# Patient Record
Sex: FEMALE | Race: WHITE | Employment: UNEMPLOYED | URBAN - METROPOLITAN AREA
[De-identification: names, ages, dates, MRNs, and addresses within clinical notes are randomized per-mention and may not be internally consistent; named-entity substitution may affect disease eponyms.]

---

## 2022-09-23 LAB
ANTIBODY SCREEN, EXTERNAL: NEGATIVE
CHLAMYDIA, EXTERNAL: NEGATIVE
HBSAG, EXTERNAL: NEGATIVE
HBSAG, EXTERNAL: NEGATIVE
HBSAG, EXTERNAL: NON REACTIVE
HBSAG, EXTERNAL: NON REACTIVE
HEPATITIS C AB,   EXT: NORMAL
HIV, EXTERNAL: NORMAL
N. GONORRHEA, EXTERNAL: NEGATIVE
RUBELLA, EXTERNAL: NORMAL
T. PALLIDUM, EXTERNAL: NONREACTIVE
T. PALLIDUM, EXTERNAL: NORMAL
TYPE, ABO & RH, EXTERNAL: NORMAL

## 2022-11-17 ENCOUNTER — HOSPITAL ENCOUNTER (INPATIENT)
Age: 22
LOS: 6 days | Discharge: HOME | DRG: 560 | End: 2022-11-23
Attending: OBSTETRICS & GYNECOLOGY | Admitting: OBSTETRICS & GYNECOLOGY
Payer: COMMERCIAL

## 2022-11-17 PROBLEM — O42.90 PROM (PREMATURE RUPTURE OF MEMBRANES): Status: ACTIVE | Noted: 2022-11-17

## 2022-11-17 LAB
BASOPHILS # BLD: 0 K/UL (ref 0–0.1)
BASOPHILS NFR BLD: 0 % (ref 0–1)
DIFFERENTIAL METHOD BLD: ABNORMAL
EOSINOPHIL # BLD: 0.2 K/UL (ref 0–0.4)
EOSINOPHIL NFR BLD: 1 % (ref 0–7)
ERYTHROCYTE [DISTWIDTH] IN BLOOD BY AUTOMATED COUNT: 13 % (ref 11.5–14.5)
HCT VFR BLD AUTO: 37.5 % (ref 35–47)
HGB BLD-MCNC: 12.7 G/DL (ref 11.5–16)
IMM GRANULOCYTES # BLD AUTO: 0.1 K/UL (ref 0–0.04)
IMM GRANULOCYTES NFR BLD AUTO: 1 % (ref 0–0.5)
LYMPHOCYTES # BLD: 2.3 K/UL (ref 0.8–3.5)
LYMPHOCYTES NFR BLD: 17 % (ref 12–49)
MCH RBC QN AUTO: 29.7 PG (ref 26–34)
MCHC RBC AUTO-ENTMCNC: 33.9 G/DL (ref 30–36.5)
MCV RBC AUTO: 87.6 FL (ref 80–99)
MONOCYTES # BLD: 1.1 K/UL (ref 0–1)
MONOCYTES NFR BLD: 8 % (ref 5–13)
NEUTS SEG # BLD: 9.6 K/UL (ref 1.8–8)
NEUTS SEG NFR BLD: 73 % (ref 32–75)
NRBC # BLD: 0 K/UL (ref 0–0.01)
NRBC BLD-RTO: 0 PER 100 WBC
PLATELET # BLD AUTO: 308 K/UL (ref 150–400)
PMV BLD AUTO: 11.2 FL (ref 8.9–12.9)
RBC # BLD AUTO: 4.28 M/UL (ref 3.8–5.2)
WBC # BLD AUTO: 13.2 K/UL (ref 3.6–11)

## 2022-11-17 PROCEDURE — 74011000258 HC RX REV CODE- 258: Performed by: OBSTETRICS & GYNECOLOGY

## 2022-11-17 PROCEDURE — 74011250637 HC RX REV CODE- 250/637: Performed by: OBSTETRICS & GYNECOLOGY

## 2022-11-17 PROCEDURE — 99285 EMERGENCY DEPT VISIT HI MDM: CPT

## 2022-11-17 PROCEDURE — 75410000002 HC LABOR FEE PER 1 HR

## 2022-11-17 PROCEDURE — 74011250636 HC RX REV CODE- 250/636: Performed by: OBSTETRICS & GYNECOLOGY

## 2022-11-17 PROCEDURE — 65270000029 HC RM PRIVATE

## 2022-11-17 PROCEDURE — 85025 COMPLETE CBC W/AUTO DIFF WBC: CPT

## 2022-11-17 PROCEDURE — 36415 COLL VENOUS BLD VENIPUNCTURE: CPT

## 2022-11-17 RX ORDER — SODIUM CHLORIDE 0.9 % (FLUSH) 0.9 %
5-40 SYRINGE (ML) INJECTION AS NEEDED
Status: DISCONTINUED | OUTPATIENT
Start: 2022-11-17 | End: 2022-11-21

## 2022-11-17 RX ORDER — SODIUM CHLORIDE, SODIUM LACTATE, POTASSIUM CHLORIDE, CALCIUM CHLORIDE 600; 310; 30; 20 MG/100ML; MG/100ML; MG/100ML; MG/100ML
125 INJECTION, SOLUTION INTRAVENOUS CONTINUOUS
Status: DISCONTINUED | OUTPATIENT
Start: 2022-11-17 | End: 2022-11-21

## 2022-11-17 RX ORDER — SODIUM CHLORIDE 0.9 % (FLUSH) 0.9 %
5-40 SYRINGE (ML) INJECTION EVERY 8 HOURS
Status: DISCONTINUED | OUTPATIENT
Start: 2022-11-17 | End: 2022-11-21

## 2022-11-17 RX ORDER — BETAMETHASONE SODIUM PHOSPHATE AND BETAMETHASONE ACETATE 3; 3 MG/ML; MG/ML
12 INJECTION, SUSPENSION INTRA-ARTICULAR; INTRALESIONAL; INTRAMUSCULAR; SOFT TISSUE ONCE
Status: COMPLETED | OUTPATIENT
Start: 2022-11-17 | End: 2022-11-17

## 2022-11-17 RX ORDER — AZITHROMYCIN 250 MG/1
250 TABLET, FILM COATED ORAL ONCE
Status: COMPLETED | OUTPATIENT
Start: 2022-11-17 | End: 2022-11-17

## 2022-11-17 RX ADMIN — AMPICILLIN SODIUM 2 G: 2 INJECTION, POWDER, FOR SOLUTION INTRAMUSCULAR; INTRAVENOUS at 20:45

## 2022-11-17 RX ADMIN — BETAMETHASONE SODIUM PHOSPHATE AND BETAMETHASONE ACETATE 12 MG: 3; 3 INJECTION, SUSPENSION INTRA-ARTICULAR; INTRALESIONAL; INTRAMUSCULAR; SOFT TISSUE at 20:48

## 2022-11-17 RX ADMIN — AZITHROMYCIN MONOHYDRATE 250 MG: 250 TABLET ORAL at 20:43

## 2022-11-17 RX ADMIN — SODIUM CHLORIDE, POTASSIUM CHLORIDE, SODIUM LACTATE AND CALCIUM CHLORIDE 125 ML/HR: 600; 310; 30; 20 INJECTION, SOLUTION INTRAVENOUS at 20:40

## 2022-11-18 LAB
A1 MICROGLOB PLACENTAL VAG QL: NEGATIVE
A1 MICROGLOB PLACENTAL VAG QL: POSITIVE
AMPHET UR QL SCN: NEGATIVE
BARBITURATES UR QL SCN: NEGATIVE
BENZODIAZ UR QL: NEGATIVE
CANNABINOIDS UR QL SCN: POSITIVE
COCAINE UR QL SCN: NEGATIVE
COMMENT, HOLDF: NORMAL
CONTROL LINE PRESENT?: NORMAL
CONTROL LINE PRESENT?: NORMAL
DRUG SCRN COMMENT,DRGCM: ABNORMAL
EXPIRATION DATE: NORMAL
EXPIRATION DATE: NORMAL
INTERNAL NEGATIVE CONTROL: NORMAL
INTERNAL NEGATIVE CONTROL: NORMAL
KIT LOT NO.: NORMAL
KIT LOT NO.: NORMAL
METHADONE UR QL: NEGATIVE
OPIATES UR QL: NEGATIVE
PCP UR QL: NEGATIVE
SAMPLES BEING HELD,HOLD: NORMAL

## 2022-11-18 PROCEDURE — 74011250637 HC RX REV CODE- 250/637: Performed by: OBSTETRICS & GYNECOLOGY

## 2022-11-18 PROCEDURE — 87491 CHLMYD TRACH DNA AMP PROBE: CPT

## 2022-11-18 PROCEDURE — 99204 OFFICE O/P NEW MOD 45 MIN: CPT | Performed by: OBSTETRICS & GYNECOLOGY

## 2022-11-18 PROCEDURE — 87081 CULTURE SCREEN ONLY: CPT

## 2022-11-18 PROCEDURE — 75410000002 HC LABOR FEE PER 1 HR

## 2022-11-18 PROCEDURE — 74011000258 HC RX REV CODE- 258: Performed by: OBSTETRICS & GYNECOLOGY

## 2022-11-18 PROCEDURE — 74011250636 HC RX REV CODE- 250/636: Performed by: OBSTETRICS & GYNECOLOGY

## 2022-11-18 PROCEDURE — 80307 DRUG TEST PRSMV CHEM ANLYZR: CPT

## 2022-11-18 PROCEDURE — 84112 EVAL AMNIOTIC FLUID PROTEIN: CPT | Performed by: OBSTETRICS & GYNECOLOGY

## 2022-11-18 PROCEDURE — 65270000029 HC RM PRIVATE

## 2022-11-18 RX ORDER — ACETAMINOPHEN 325 MG/1
650 TABLET ORAL
Status: DISCONTINUED | OUTPATIENT
Start: 2022-11-18 | End: 2022-11-21

## 2022-11-18 RX ORDER — FENTANYL CITRATE 50 UG/ML
50 INJECTION, SOLUTION INTRAMUSCULAR; INTRAVENOUS
Status: DISCONTINUED | OUTPATIENT
Start: 2022-11-18 | End: 2022-11-21

## 2022-11-18 RX ORDER — NALBUPHINE HYDROCHLORIDE 20 MG/ML
10 INJECTION, SOLUTION INTRAMUSCULAR; INTRAVENOUS; SUBCUTANEOUS
Status: DISCONTINUED | OUTPATIENT
Start: 2022-11-18 | End: 2022-11-21

## 2022-11-18 RX ORDER — TERBUTALINE SULFATE 1 MG/ML
0.25 INJECTION SUBCUTANEOUS AS NEEDED
Status: DISCONTINUED | OUTPATIENT
Start: 2022-11-18 | End: 2022-11-21

## 2022-11-18 RX ORDER — SODIUM CHLORIDE 0.9 % (FLUSH) 0.9 %
5-40 SYRINGE (ML) INJECTION AS NEEDED
Status: DISCONTINUED | OUTPATIENT
Start: 2022-11-18 | End: 2022-11-21

## 2022-11-18 RX ORDER — SODIUM CHLORIDE 0.9 % (FLUSH) 0.9 %
5-40 SYRINGE (ML) INJECTION EVERY 8 HOURS
Status: DISCONTINUED | OUTPATIENT
Start: 2022-11-18 | End: 2022-11-21

## 2022-11-18 RX ORDER — OXYTOCIN/RINGER'S LACTATE 30/500 ML
87.3 PLASTIC BAG, INJECTION (ML) INTRAVENOUS AS NEEDED
Status: DISCONTINUED | OUTPATIENT
Start: 2022-11-18 | End: 2022-11-21

## 2022-11-18 RX ORDER — BETAMETHASONE SODIUM PHOSPHATE AND BETAMETHASONE ACETATE 3; 3 MG/ML; MG/ML
12 INJECTION, SUSPENSION INTRA-ARTICULAR; INTRALESIONAL; INTRAMUSCULAR; SOFT TISSUE ONCE
Status: COMPLETED | OUTPATIENT
Start: 2022-11-18 | End: 2022-11-18

## 2022-11-18 RX ORDER — IBUPROFEN 200 MG
1 TABLET ORAL DAILY
Status: DISCONTINUED | OUTPATIENT
Start: 2022-11-18 | End: 2022-11-23 | Stop reason: HOSPADM

## 2022-11-18 RX ORDER — OXYTOCIN/RINGER'S LACTATE 30/500 ML
0-20 PLASTIC BAG, INJECTION (ML) INTRAVENOUS
Status: DISCONTINUED | OUTPATIENT
Start: 2022-11-19 | End: 2022-11-21

## 2022-11-18 RX ORDER — SODIUM CHLORIDE, SODIUM LACTATE, POTASSIUM CHLORIDE, CALCIUM CHLORIDE 600; 310; 30; 20 MG/100ML; MG/100ML; MG/100ML; MG/100ML
125 INJECTION, SOLUTION INTRAVENOUS CONTINUOUS
Status: DISCONTINUED | OUTPATIENT
Start: 2022-11-18 | End: 2022-11-21

## 2022-11-18 RX ORDER — OXYTOCIN/RINGER'S LACTATE 30/500 ML
10 PLASTIC BAG, INJECTION (ML) INTRAVENOUS AS NEEDED
Status: COMPLETED | OUTPATIENT
Start: 2022-11-18 | End: 2022-11-21

## 2022-11-18 RX ORDER — IBUPROFEN 200 MG
1 TABLET ORAL DAILY
Status: DISCONTINUED | OUTPATIENT
Start: 2022-11-19 | End: 2022-11-18

## 2022-11-18 RX ADMIN — BETAMETHASONE SODIUM PHOSPHATE AND BETAMETHASONE ACETATE 12 MG: 3; 3 INJECTION, SUSPENSION INTRA-ARTICULAR; INTRALESIONAL; INTRAMUSCULAR; SOFT TISSUE at 20:51

## 2022-11-18 RX ADMIN — AMPICILLIN SODIUM 2 G: 2 INJECTION, POWDER, FOR SOLUTION INTRAMUSCULAR; INTRAVENOUS at 03:33

## 2022-11-18 RX ADMIN — ACETAMINOPHEN 650 MG: 325 TABLET ORAL at 19:44

## 2022-11-18 RX ADMIN — AMPICILLIN SODIUM 2 G: 2 INJECTION, POWDER, FOR SOLUTION INTRAMUSCULAR; INTRAVENOUS at 23:08

## 2022-11-18 RX ADMIN — DINOPROSTONE 10 MG: 10 INSERT VAGINAL at 13:49

## 2022-11-18 RX ADMIN — SODIUM CHLORIDE, POTASSIUM CHLORIDE, SODIUM LACTATE AND CALCIUM CHLORIDE 125 ML/HR: 600; 310; 30; 20 INJECTION, SOLUTION INTRAVENOUS at 21:30

## 2022-11-18 RX ADMIN — AMPICILLIN SODIUM 2 G: 2 INJECTION, POWDER, FOR SOLUTION INTRAMUSCULAR; INTRAVENOUS at 11:02

## 2022-11-18 RX ADMIN — AMPICILLIN SODIUM 2 G: 2 INJECTION, POWDER, FOR SOLUTION INTRAMUSCULAR; INTRAVENOUS at 17:01

## 2022-11-18 NOTE — ED PROVIDER NOTES
HPI   C/o leaking fluid x 2 weeks  Denies fever, chills,   Pos Fetal movements  No contractions  No vaginal Bleeding  No chief complaint on file. No past medical history on file. No past surgical history on file. No family history on file. Social History     Socioeconomic History    Marital status: Not on file     Spouse name: Not on file    Number of children: Not on file    Years of education: Not on file    Highest education level: Not on file   Occupational History    Not on file   Tobacco Use    Smoking status: Not on file    Smokeless tobacco: Not on file   Substance and Sexual Activity    Alcohol use: Not on file    Drug use: Not on file    Sexual activity: Not on file   Other Topics Concern    Not on file   Social History Narrative    Not on file     Social Determinants of Health     Financial Resource Strain: Not on file   Food Insecurity: Not on file   Transportation Needs: Not on file   Physical Activity: Not on file   Stress: Not on file   Social Connections: Not on file   Intimate Partner Violence: Not on file   Housing Stability: Not on file         ALLERGIES: Patient has no allergy information on record. Review of Systems   Constitutional: Negative. HENT: Negative. Eyes: Negative. Respiratory: Negative. Cardiovascular: Negative. Gastrointestinal: Negative. Endocrine: Negative. Genitourinary: Negative. Musculoskeletal: Negative. Skin: Negative. Allergic/Immunologic: Negative. Neurological: Negative. Hematological: Negative. Psychiatric/Behavioral: Negative. Vitals:    11/17/22 1822   BP: (!) 102/59   Pulse: 95   Resp: 18   Temp: 98.2 °F (36.8 °C)            Physical Exam  Vitals and nursing note reviewed. Exam conducted with a chaperone present. Constitutional:       Appearance: Normal appearance. HENT:      Head: Normocephalic and atraumatic. Nose: Nose normal.      Mouth/Throat:      Mouth: Mucous membranes are dry.    Eyes: Extraocular Movements: Extraocular movements intact. Pupils: Pupils are equal, round, and reactive to light. Cardiovascular:      Rate and Rhythm: Normal rate and regular rhythm. Pulmonary:      Effort: Pulmonary effort is normal.   Abdominal:      General: Abdomen is flat. Genitourinary:     General: Normal vulva. Rectum: Normal.   Musculoskeletal:         General: Normal range of motion. Cervical back: Normal range of motion. Skin:     General: Skin is warm. Capillary Refill: Capillary refill takes less than 2 seconds. Neurological:      General: No focal deficit present. Mental Status: She is alert. Psychiatric:         Behavior: Behavior normal.      SVE:  V/V/- NEFG w/o clinicle evidence of PPROM and w/o any amniotic fluid  Cervix- Closed  Ut S=D  Adnexa- nonpalpable    MDM  Risk of Complications, Morbidity, and/or Mortality  Presenting problems: moderate  Diagnostic procedures: moderate  Management options: moderate                 Procedures  NST  FHR= 130  Variability- moderate  Decelerations- no  Accelerations- yes  Variability- moderate    [unfilled]   @ 35w5d w/o clinical evidence of PPROM on exam , however AMY is low and pad was saturated which is c/w likely ROM.   Will admit for expectant management

## 2022-11-18 NOTE — PROGRESS NOTES
1700. Bedside and Verbal shift change report given to alistair Dyer rn (oncoming nurse) by Brunilda Payan rn (offgoing nurse). Report included the following information SBAR, Intake/Output, MAR, and Recent Results.      2000. Bedside and Verbal shift change report given to leroy Ulloa rn (oncoming nurse) by alistair Dyer rn (offgoing nurse). Report included the following information SBAR, Intake/Output, MAR, and Recent Results.

## 2022-11-18 NOTE — PROGRESS NOTES
1940 Bedside and Verbal shift change report given to DOUGLAS Greene (oncoming nurse) by IFEANYI Hernandez (offgoing nurse). Report included the following information SBAR, Intake/Output, MAR, and Recent Results. 3010 Bedside and Verbal shift change report given to ROXANA Bowen (oncoming nurse) by Citlali López (offgoing nurse). Report included the following information SBAR, Intake/Output, MAR, and Recent Results.

## 2022-11-18 NOTE — PROGRESS NOTES
3868 Bedside and Verbal shift change report given to Diallo Gonzalez RN (oncoming nurse) by Roro Loya RN (offgoing nurse). Report included the following information SBAR, Procedure Summary, Accordion, and Recent Results. 0830 Viktorzer on call for [phone] Maile Mendez in to see pt. Amnisure test done, results came back negative. M called per MD order for AF check & growth scan. Awaiting appointment time. 8747 MFM called will see pt now. EFM. LR stopped. Pt up to BR. Denies leaking of fluid or pink discharge on dejan pad.    0930 Pt transferred via wheelchair to [facility]. 0 Pt returned via wheelchair from [facility]. [place] aware. POC to be discussed     1052 EFM resumed. 1102 IVF of LR resumed. Ampicillin 2 GM continues as GBS is unknown. 2305 [facility] on unit. MD reviewed [facility] report. Requested that supplies be gathered for ferning test. Pt flat in bed for pooling of any fluid to occur. 1135 Bedside and Verbal shift change report given to Marbella Swanson RN (oncoming nurse) by Diallo Gonzalez RN (offgoing nurse). Report included the following information SBAR, Procedure Summary, MAR, Accordion, and Recent Results.

## 2022-11-18 NOTE — PROGRESS NOTES
1135 Bedside report received from Nubia Gregg RN. Pt laying supine in anticipation of pooling test.     1235 Dr Christopher Watson at bedside, discussing plan of care with pt. Pt sleepy, continues to fall asleep while talking. Sterile speculum exam performed, pooling noted, amnisure positive, nitrazine negative. Plan for induction using Cervidil due to oligo. Mami 73 Dr Christopher Watson at bedside, Cervidil placed. 1640 Verbal report given to Gloria Delacruz RN. Pt resting, denies complaints.

## 2022-11-18 NOTE — PROGRESS NOTES
Ante Partum Progress Note    Julian Flores  37T6M    Assessment: 35w6d admitted with complaint of rupture of membranes. PPROM: Per MFM, oligohydramnios on ultrasound today. Exam was repeated - positive pooling, amnisure, and fluid leaking from cervix after patient asked to cough. Exam consistent with PPROM. Recommend IOL. Will start cervical ripening with cervidil. Plan pitocin in AM.      IUP: s/p one dose BMZ, second dose due at 2100 tonight. EFW 2378g (19%, AC 10%) today. Scant prenatal care: patient was incarcerated early in the pregnancy, then reports she didn't have a ride. Vape and marijuana use: patient has been advised to quit    Plan:  Cervidil tonight  Plan pitocin in AM  Amp for GBS unknown  Send gc/ch cultures    Orders/Charges: High and Non Stress Test    Subjective:  Patient has no complaints. She states she's been leaking for the last week. Her underwear constantly get wet and she has to change them. She had a gush of fluid yesterday when she was already on her way to the hospital.      Vitals:  Visit Vitals  /63 (BP 1 Location: Left upper arm, BP Patient Position: At rest)   Pulse 74   Temp 98.6 °F (37 °C)   Resp 16     Temp (24hrs), Av.3 °F (36.8 °C), Min:98.1 °F (36.7 °C), Max:98.6 °F (37 °C)      Last 24hr Input/Output:    Intake/Output Summary (Last 24 hours) at 2022 1259  Last data filed at 2022 1102  Gross per 24 hour   Intake 225 ml   Output --   Net 225 ml        Non stress test:  125, moderate variability, positive accelerations, no decelerations  Tocometry: no contractions    Patient Vitals for the past 4 hrs: Mode Fetal Heart Rate Fetal Activity Variability Decelerations Accelerations RN Reviewed Strip?   22 1129 External 125 Present 6-25 BPM Variable Yes Yes   22 1100 External 125 Present -- Variable Yes Yes   22 1052 US Readjusted; External 130 Present -- -- -- Yes   22 0915 External 135 -- 6-25 BPM -- Yes Yes 11/18/22 0904 US Readjusted; External 120 Present -- -- -- Yes    Patient Vitals for the past 4 hrs: Mode Fetal Heart Rate Fetal Activity Variability Decelerations Accelerations RN Reviewed Strip?   11/18/22 1129 External 125 Present 6-25 BPM Variable Yes Yes   11/18/22 1100 External 125 Present -- Variable Yes Yes   11/18/22 1052 US Readjusted; External 130 Present -- -- -- Yes   11/18/22 0915 External 135 -- 6-25 BPM -- Yes Yes   11/18/22 0904 US Readjusted; External 120 Present -- -- -- Yes            Exam:    GEN: NAD, sleeping, arousable to voice  Pulm: no resp distress  Abd: soft, mild diffuse tenderness but no specific tenderness (patient attributes this to tenderness after her ultrasound)  : no lesions, cervix ftp, 30%, high, vtx palpable  Speculum: pooling of cloudy fluid, +fluid leaking from cervical os when patient asked to cough, negative nitrazine, amnisure positive (no microscope available for ferning slide)      Labs:     Lab Results   Component Value Date/Time    WBC 13.2 (H) 11/17/2022 08:24 PM    HGB 12.7 11/17/2022 08:24 PM    HCT 37.5 11/17/2022 08:24 PM    PLATELET 975 48/30/2040 08:24 PM       Recent Results (from the past 24 hour(s))   CBC WITH AUTOMATED DIFF    Collection Time: 11/17/22  8:24 PM   Result Value Ref Range    WBC 13.2 (H) 3.6 - 11.0 K/uL    RBC 4.28 3.80 - 5.20 M/uL    HGB 12.7 11.5 - 16.0 g/dL    HCT 37.5 35.0 - 47.0 %    MCV 87.6 80.0 - 99.0 FL    MCH 29.7 26.0 - 34.0 PG    MCHC 33.9 30.0 - 36.5 g/dL    RDW 13.0 11.5 - 14.5 %    PLATELET 548 843 - 422 K/uL    MPV 11.2 8.9 - 12.9 FL    NRBC 0.0 0  WBC    ABSOLUTE NRBC 0.00 0.00 - 0.01 K/uL    NEUTROPHILS 73 32 - 75 %    LYMPHOCYTES 17 12 - 49 %    MONOCYTES 8 5 - 13 %    EOSINOPHILS 1 0 - 7 %    BASOPHILS 0 0 - 1 %    IMMATURE GRANULOCYTES 1 (H) 0.0 - 0.5 %    ABS. NEUTROPHILS 9.6 (H) 1.8 - 8.0 K/UL    ABS. LYMPHOCYTES 2.3 0.8 - 3.5 K/UL    ABS. MONOCYTES 1.1 (H) 0.0 - 1.0 K/UL    ABS.  EOSINOPHILS 0.2 0.0 - 0.4 K/UL ABS. BASOPHILS 0.0 0.0 - 0.1 K/UL    ABS. IMM. GRANS. 0.1 (H) 0.00 - 0.04 K/UL    DF AUTOMATED     RUPTURE OF FETAL MEMBRANES, POC    Collection Time: 11/18/22  8:12 AM   Result Value Ref Range    Rupture of fetal membrane Negative Negative    Control line present? Acceptable     Internal negative control Acceptable     Kit Lot No. [id number]     Expiration date 01/06/25    RUPTURE OF FETAL MEMBRANES, POC    Collection Time: 11/18/22 12:44 PM   Result Value Ref Range    Rupture of fetal membrane Positive Negative    Control line present?  Acceptable     Internal negative control Acceptable     Kit Lot No. [id number]     Expiration date 1/6/2025

## 2022-11-18 NOTE — PROGRESS NOTES
Cervidil placed in posterior fornix    Counseled about nicotine use, encouraged cessation. Offered a nicotine patch which patient accepted.

## 2022-11-18 NOTE — CONSULTS
MATERNAL FETAL MEDICINE  INPATIENT CONSULTATION    REQUESTED BY: Luiza Manley MD   DATE OF CONSULT: 22    REASON FOR CONSULTATION: oligohydramnios     MICHAEL Morrison is a 20 y.o.  at 35w6d admitted yesterday c/o a gush of fluid and some spotting. [place] reports 2 early sAbs in  & 2018 neither of which required a D&C; she denies other pregnancies. She reports regular menses prior to this pregnancy; she denies depression. Zoloft caused anaphylaxis. She does not report other significant history. She declined genetic screening. Patient Active Problem List   Diagnosis Code    PROM (premature rupture of membranes) O42.90       History reviewed. No pertinent past medical history. Past Surgical History:   Procedure Laterality Date    HX OTHER SURGICAL Left     Foot surgery       Allergies   Allergen Reactions    Zoloft [Sertraline] Hives and Swelling         Current Facility-Administered Medications:     sodium chloride (NS) flush 5-40 mL, 5-40 mL, IntraVENous, Q8H, Valerio Nichols MD    sodium chloride (NS) flush 5-40 mL, 5-40 mL, IntraVENous, PRN, Valerio Nichols MD    ampicillin (OMNIPEN) 2 g in 0.9% sodium chloride (MBP/ADV) 100 mL MBP, 2 g, IntraVENous, Q6H, Valerio Nichols MD, Last Rate: 200 mL/hr at 22 0333, 2 g at 22 0333    lactated Ringers infusion, 125 mL/hr, IntraVENous, CONTINUOUS, Valerio Nichols MD, Last Rate: 125 mL/hr at 22, 125 mL/hr at 22    Social History     Tobacco Use    Smoking status: Every Day    Smokeless tobacco: Current   Vaping Use    Vaping Use: Every day    Substances: Nicotine, Flavoring    Devices: Pre-filled or refillable cartridge   Substance Use Topics    Alcohol use: Not Currently    Drug use: Yes     Types: Marijuana       History reviewed. No pertinent family history.     Visit Vitals  BP (!) 120/56   Pulse 70   Temp 98.1 °F (36.7 °C)   Resp 16       Gen: Comfortable, NAD  Resp: Comfortable respirations  CV: Well perfused  Ext: Moving all extremities well  Neuro: Alert, interactive, appropriate    Recent Results (from the past 24 hour(s))   CBC WITH AUTOMATED DIFF    Collection Time: 11/17/22  8:24 PM   Result Value Ref Range    WBC 13.2 (H) 3.6 - 11.0 K/uL    RBC 4.28 3.80 - 5.20 M/uL    HGB 12.7 11.5 - 16.0 g/dL    HCT 37.5 35.0 - 47.0 %    MCV 87.6 80.0 - 99.0 FL    MCH 29.7 26.0 - 34.0 PG    MCHC 33.9 30.0 - 36.5 g/dL    RDW 13.0 11.5 - 14.5 %    PLATELET 657 433 - 124 K/uL    MPV 11.2 8.9 - 12.9 FL    NRBC 0.0 0  WBC    ABSOLUTE NRBC 0.00 0.00 - 0.01 K/uL    NEUTROPHILS 73 32 - 75 %    LYMPHOCYTES 17 12 - 49 %    MONOCYTES 8 5 - 13 %    EOSINOPHILS 1 0 - 7 %    BASOPHILS 0 0 - 1 %    IMMATURE GRANULOCYTES 1 (H) 0.0 - 0.5 %    ABS. NEUTROPHILS 9.6 (H) 1.8 - 8.0 K/UL    ABS. LYMPHOCYTES 2.3 0.8 - 3.5 K/UL    ABS. MONOCYTES 1.1 (H) 0.0 - 1.0 K/UL    ABS. EOSINOPHILS 0.2 0.0 - 0.4 K/UL    ABS. BASOPHILS 0.0 0.0 - 0.1 K/UL    ABS. IMM. GRANS. 0.1 (H) 0.00 - 0.04 K/UL    DF AUTOMATED     RUPTURE OF FETAL MEMBRANES, POC    Collection Time: 11/18/22  8:12 AM   Result Value Ref Range    Rupture of fetal membrane Negative Negative    Control line present? Acceptable     Internal negative control Acceptable     Kit Lot No. [id number]     Expiration date 01/06/25        ULTRASOUND:  11/18/22: Single viable IUP in vertex position with biometry consistent with her EDC; EFW = 2378g (19th%, AC = 10th%). Fetal anatomy seen appears WNL although the scan is globally limited due to late gestational age. There is oligohydramnios with MVP = 4.9cm. Posterior placenta appears WNL although views are limited by late gestational age. BPP 8/8    We discussed risks of vaping to the fetus including PPROM and if this happens early could cause permanent organ damage including brain damage or death. I recommended complete cessation before her next pregnancy.       We discussed risks of marijuana including predilection of THC for fetal neurons and unknown long term neurodevelopmental effects to the fetus. I recommended complete cessation for her next pregnancy. Patient was counseled on the findings. Questions and concerns were addressed. ASSESSMENT:    20 y.o.  at 35w6d with likley PPROM. PLAN:  Would pool patient for at least 30 min and then do pool/nitrazine/fern testing  Deliver if rules-in for PPROM; if rules-out would repeat BPP in 1 week  BMZ 22 dose #2 today  On latency abx  NICU consult   Reassuring fetal status    A total of 60 minutes was spent on this visit reviewing previous notes, counseling the patient and documenting the findings in the note.     Morgan Donald M.D.

## 2022-11-18 NOTE — PROGRESS NOTES
~0440: The patient arrived wheelchair from with reports of leaking of clear, pink tinged fluid for 2 weeks. The patient reports fetal movement, but not as much movement as usual.  The patient and her family member are escorted to [place] [id number].  ~182: Dr Randi Lieberman is informed of the patient's arrival.  He is assisting with a  and states that he will see the patient when he is finished. Dr Randi Lieberman states that it is okay to check the patient.  ~1830: Nitrazine is negative with SVE, but positive on fluid on sanitary pad.  ~185: Dr Randi Lieberman is at the bedside speaking with the patient. Speculum exam and SVE done. ~5311: Bedside ultrasound is done to view fluid level. Fetus is vertex. Dr Randi Lieberman is discussing the plan of care with the patient (admit as inpatient, antibiotics, ultrasound in the morning). ~3226: The patient is transferred ambulatory to [facility] accompanied by her sister. All belongings are collected before transfer. ~194: TRANSFER - OUT REPORT:    Verbal report given to S. Sharol Lombard, RN on [place]  being transferred to [facility] for routine progression of care       Report consisted of patients Situation, Background, Assessment and   Recommendations(SBAR). Information from the following report(s) SBAR was reviewed with the receiving nurse. Lines:       Opportunity for questions and clarification was provided.       Patient transported with:   Registered Nurse

## 2022-11-19 PROCEDURE — 74011250636 HC RX REV CODE- 250/636: Performed by: OBSTETRICS & GYNECOLOGY

## 2022-11-19 PROCEDURE — 74011000258 HC RX REV CODE- 258: Performed by: OBSTETRICS & GYNECOLOGY

## 2022-11-19 PROCEDURE — 65270000029 HC RM PRIVATE

## 2022-11-19 PROCEDURE — 75410000002 HC LABOR FEE PER 1 HR

## 2022-11-19 RX ORDER — CALCIUM CARBONATE 200(500)MG
1000 TABLET,CHEWABLE ORAL ONCE
Status: COMPLETED | OUTPATIENT
Start: 2022-11-20 | End: 2022-11-20

## 2022-11-19 RX ADMIN — AMPICILLIN SODIUM 2 G: 2 INJECTION, POWDER, FOR SOLUTION INTRAMUSCULAR; INTRAVENOUS at 10:56

## 2022-11-19 RX ADMIN — SODIUM CHLORIDE, POTASSIUM CHLORIDE, SODIUM LACTATE AND CALCIUM CHLORIDE 125 ML/HR: 600; 310; 30; 20 INJECTION, SOLUTION INTRAVENOUS at 22:43

## 2022-11-19 RX ADMIN — NALBUPHINE HYDROCHLORIDE 10 MG: 20 INJECTION, SOLUTION INTRAMUSCULAR; INTRAVENOUS; SUBCUTANEOUS at 11:51

## 2022-11-19 RX ADMIN — AMPICILLIN SODIUM 2 G: 2 INJECTION, POWDER, FOR SOLUTION INTRAMUSCULAR; INTRAVENOUS at 16:54

## 2022-11-19 RX ADMIN — OXYTOCIN 8 MILLI-UNITS/MIN: 10 INJECTION INTRAVENOUS at 11:50

## 2022-11-19 RX ADMIN — OXYTOCIN 2 MILLI-UNITS/MIN: 10 INJECTION INTRAVENOUS at 06:05

## 2022-11-19 RX ADMIN — PROMETHAZINE HYDROCHLORIDE 12.5 MG: 25 INJECTION INTRAMUSCULAR; INTRAVENOUS at 11:47

## 2022-11-19 RX ADMIN — AMPICILLIN SODIUM 2 G: 2 INJECTION, POWDER, FOR SOLUTION INTRAMUSCULAR; INTRAVENOUS at 05:08

## 2022-11-19 RX ADMIN — NALBUPHINE HYDROCHLORIDE 10 MG: 20 INJECTION, SOLUTION INTRAMUSCULAR; INTRAVENOUS; SUBCUTANEOUS at 19:47

## 2022-11-19 RX ADMIN — SODIUM CHLORIDE, POTASSIUM CHLORIDE, SODIUM LACTATE AND CALCIUM CHLORIDE 125 ML/HR: 600; 310; 30; 20 INJECTION, SOLUTION INTRAVENOUS at 15:04

## 2022-11-19 NOTE — PROGRESS NOTES
Labor Progress Note  Patient seen, fetal heart rate and contraction pattern evaluated, patient examined. She's starting to feel some crampy pain. No data found. Physical Exam:  Cervical Exam:  deferred  Membranes:   Premature Rupture of Membranes; Amniotic Fluid: clear fluid  Uterine Activity: ild, q3-5 min  Fetal Heart Rate: Baseline: 120 per minute  Variability: moderate  Accelerations: yes  Decelerations: none    Pit @ 16    Assessment/Plan:  21 yo  @ 36 wks. Induction for PPROM.  Continue pitocin

## 2022-11-19 NOTE — PROGRESS NOTES
1300 Bedside and Verbal shift change report given to IFEANYI Lucas RN (oncoming nurse) by IFEANYI Ho RN (offgoing nurse). Report included the following information SBAR, Intake/Output, MAR, and Recent Results. 1 Dr Macy Machado at bedside and reviewed fhr strip    1940 Bedside and Verbal shift change report given to IFEANYI Hernandez RN (oncoming nurse) by Jeff Monroe RN (offgoing nurse). Report included the following information SBAR, Intake/Output, MAR, and Recent Results.

## 2022-11-19 NOTE — PROGRESS NOTES
2000 Bedside and Verbal shift change report given to DOUGLAS Linares (oncoming nurse) by Jeovanny Zarco (offgoing nurse). Report included the following information SBAR, Intake/Output, MAR, and Recent Results. 0225 SVE 1-2. After attempt to remove cervidil, no cervidil noted. Cervidil discovered in bedside commode. Pt unaware of when this occurred. 0730 Bedside and Verbal shift change report given to IFEANYI Bustos (oncoming nurse) by Trinidad Dhillon (offgoing nurse). Report included the following information SBAR, Intake/Output, MAR, and Recent Results.

## 2022-11-19 NOTE — PROGRESS NOTES
@0526 Bedside shift change report given to IFEANYI Ho RN (oncoming nurse) by Bernadette Lenz RN (offgoing nurse). Report included the following information SBAR, Kardex, Intake/Output, and MAR.      @1100 Patient moving often in the bed due to discomfort. Frequent repositioning making it difficult to obtain continuous FHT. Explained that in order to give IV pain medication we must have a tracing of FHT, patient verbalized understanding. @6571 patient up to bathroom.

## 2022-11-20 LAB
ERYTHROCYTE [DISTWIDTH] IN BLOOD BY AUTOMATED COUNT: 12.8 % (ref 11.5–14.5)
HCT VFR BLD AUTO: 33.1 % (ref 35–47)
HGB BLD-MCNC: 11 G/DL (ref 11.5–16)
MCH RBC QN AUTO: 29.9 PG (ref 26–34)
MCHC RBC AUTO-ENTMCNC: 33.2 G/DL (ref 30–36.5)
MCV RBC AUTO: 89.9 FL (ref 80–99)
NRBC # BLD: 0 K/UL (ref 0–0.01)
NRBC BLD-RTO: 0 PER 100 WBC
PLATELET # BLD AUTO: 233 K/UL (ref 150–400)
PMV BLD AUTO: 11.7 FL (ref 8.9–12.9)
RBC # BLD AUTO: 3.68 M/UL (ref 3.8–5.2)
WBC # BLD AUTO: 15.5 K/UL (ref 3.6–11)

## 2022-11-20 PROCEDURE — 65270000029 HC RM PRIVATE

## 2022-11-20 PROCEDURE — 74011250637 HC RX REV CODE- 250/637: Performed by: OBSTETRICS & GYNECOLOGY

## 2022-11-20 PROCEDURE — 85027 COMPLETE CBC AUTOMATED: CPT

## 2022-11-20 PROCEDURE — 75410000002 HC LABOR FEE PER 1 HR

## 2022-11-20 PROCEDURE — 36415 COLL VENOUS BLD VENIPUNCTURE: CPT

## 2022-11-20 RX ORDER — ZOLPIDEM TARTRATE 5 MG/1
5 TABLET ORAL ONCE
Status: COMPLETED | OUTPATIENT
Start: 2022-11-20 | End: 2022-11-20

## 2022-11-20 RX ADMIN — Medication 25 MCG: at 21:05

## 2022-11-20 RX ADMIN — ZOLPIDEM TARTRATE 5 MG: 5 TABLET ORAL at 21:11

## 2022-11-20 RX ADMIN — CALCIUM CARBONATE (ANTACID) CHEW TAB 500 MG 1000 MG: 500 CHEW TAB at 00:18

## 2022-11-20 RX ADMIN — Medication 25 MCG: at 12:25

## 2022-11-20 RX ADMIN — Medication 25 MCG: at 17:02

## 2022-11-20 NOTE — PROGRESS NOTES
0000 Verbal shift change report given to DOUGLAS Talbot (oncoming nurse) by IFEANYI Hong (offgoing nurse). Report included the following information SBAR, Intake/Output, MAR, and Recent Results. 0019 Pt sitting upright in bed. 0024 Pt ambulating to bathroom. 0215 Pt turned on left side. 0223 Pt turned on right side. 0245 Pt placed in Trendelenburg position. 200mL fluid bolus administered. 0410 RNs at bedside. Pt repositioned on right side. Pitocin stopped. 100mL fluid bolus administered. 0740 Verbal shift change report given to IFEANYI Gardiner (oncoming nurse) by David Ortiz (offgoing nurse). Report included the following information SBAR, Intake/Output, MAR, and Recent Results.

## 2022-11-20 NOTE — PROGRESS NOTES
~1940: Bedside and Verbal shift change report given to IFEANYI Valle RN by Pamela Arboleda RN. Report included the following information SBAR and MAR.

## 2022-11-20 NOTE — PROGRESS NOTES
7258 Bedside and Verbal shift change report given to IFEANYI Avendano RN (oncoming nurse) by Adonis Ren RN (offgoing nurse). Report included the following information SBAR, Intake/Output, MAR, and Recent Results. 8197 Dr Niko Ng at bedside    1930 Bedside and Verbal shift change report given to IFEANYI Wells RN (oncoming nurse) by Navarro Lopes RN (offgoing nurse). Report included the following information SBAR, Intake/Output, MAR, and Recent Results.

## 2022-11-20 NOTE — PROGRESS NOTES
Labor Progress Note  Patient seen, fetal heart rate and contraction pattern evaluated, patient examined. She's starting to feel more contractions  No data found. Physical Exam:  Cervical Exam:  1.5/50/-2  Membranes:   Premature Rupture of Membranes; Amniotic Fluid: clear fluid  Uterine Activity: q2-5 min  Fetal Heart Rate: Baseline: 120s per minute  Variability: moderate  Accelerations: yes  Decelerations: none    Assessment/Plan:  19 yo  @ 36 wks. Induction for PPROM. Not yet in labor. Will do a 2 hour pitocin break a bit later. Consider trying cytotec if no progress with pitocin.

## 2022-11-20 NOTE — PROGRESS NOTES
Labor Progress Note  Assumed care of Ms Ben Gandara  Chart reviewed    On 11/18  35w6d admitted with complaint of rupture of membranes. PPROM: Per MFM, oligohydramnios on ultrasound today. Exam was repeated - positive pooling, amnisure, and fluid leaking from cervix after patient asked to cough. Exam consistent with PPROM. Recommend IOL. Will start cervical ripening with cervidil. Plan pitocin in AM.         EFW 2378g (19%, AC 10%) on 11/18      Scant prenatal care: patient was incarcerated early in the pregnancy, then reports she didn't have a ride. Vape and marijuana use: patient has been advised to quit       S/P Cervidil, Pt for 24 hrs yesterday  Patient seen, fetal heart rate and contraction pattern evaluated. Denies feeling any contractions    VSS AF  Physical Exam:  Cervical Exam: not examined, 1 cm last night  Membranes:  Intact  Uterine Activity: Frequency: Irregular  Fetal Heart Rate: Reactive  Accelerations: yes  Decelerations: none  Uterine contractions: irregular    Assessment/Plan:  Reassuring fetal status.    Will do cytotec 25 mcg every 4 hrs    Jelly Paez MD

## 2022-11-21 ENCOUNTER — ANESTHESIA (OUTPATIENT)
Dept: LABOR AND DELIVERY | Age: 22
DRG: 560 | End: 2022-11-21
Payer: COMMERCIAL

## 2022-11-21 ENCOUNTER — ANESTHESIA EVENT (OUTPATIENT)
Dept: LABOR AND DELIVERY | Age: 22
DRG: 560 | End: 2022-11-21
Payer: COMMERCIAL

## 2022-11-21 LAB
BACTERIA SPEC CULT: NORMAL
SERVICE CMNT-IMP: NORMAL

## 2022-11-21 PROCEDURE — 74011250636 HC RX REV CODE- 250/636: Performed by: OBSTETRICS & GYNECOLOGY

## 2022-11-21 PROCEDURE — 75410000003 HC RECOV DEL/VAG/CSECN EA 0.5 HR

## 2022-11-21 PROCEDURE — 65410000002 HC RM PRIVATE OB

## 2022-11-21 PROCEDURE — 75410000000 HC DELIVERY VAGINAL/SINGLE

## 2022-11-21 PROCEDURE — 74011000250 HC RX REV CODE- 250: Performed by: ANESTHESIOLOGY

## 2022-11-21 PROCEDURE — 75410000002 HC LABOR FEE PER 1 HR

## 2022-11-21 PROCEDURE — 0HQ9XZZ REPAIR PERINEUM SKIN, EXTERNAL APPROACH: ICD-10-PCS | Performed by: OBSTETRICS & GYNECOLOGY

## 2022-11-21 PROCEDURE — 74011250636 HC RX REV CODE- 250/636: Performed by: ANESTHESIOLOGY

## 2022-11-21 PROCEDURE — 74011250637 HC RX REV CODE- 250/637: Performed by: OBSTETRICS & GYNECOLOGY

## 2022-11-21 PROCEDURE — 00HU33Z INSERTION OF INFUSION DEVICE INTO SPINAL CANAL, PERCUTANEOUS APPROACH: ICD-10-PCS | Performed by: ANESTHESIOLOGY

## 2022-11-21 PROCEDURE — 76060000078 HC EPIDURAL ANESTHESIA

## 2022-11-21 PROCEDURE — 3E033VJ INTRODUCTION OF OTHER HORMONE INTO PERIPHERAL VEIN, PERCUTANEOUS APPROACH: ICD-10-PCS | Performed by: OBSTETRICS & GYNECOLOGY

## 2022-11-21 RX ORDER — LIDOCAINE HYDROCHLORIDE AND EPINEPHRINE 15; 5 MG/ML; UG/ML
INJECTION, SOLUTION EPIDURAL AS NEEDED
Status: DISCONTINUED | OUTPATIENT
Start: 2022-11-21 | End: 2022-11-21 | Stop reason: HOSPADM

## 2022-11-21 RX ORDER — OXYTOCIN/RINGER'S LACTATE 30/500 ML
87.3 PLASTIC BAG, INJECTION (ML) INTRAVENOUS AS NEEDED
Status: DISCONTINUED | OUTPATIENT
Start: 2022-11-21 | End: 2022-11-23 | Stop reason: HOSPADM

## 2022-11-21 RX ORDER — DOCUSATE SODIUM 100 MG/1
100 CAPSULE, LIQUID FILLED ORAL
Status: DISCONTINUED | OUTPATIENT
Start: 2022-11-21 | End: 2022-11-23 | Stop reason: HOSPADM

## 2022-11-21 RX ORDER — ZOLPIDEM TARTRATE 5 MG/1
5 TABLET ORAL
Status: DISCONTINUED | OUTPATIENT
Start: 2022-11-21 | End: 2022-11-23 | Stop reason: HOSPADM

## 2022-11-21 RX ORDER — FENTANYL CITRATE 50 UG/ML
100 INJECTION, SOLUTION INTRAMUSCULAR; INTRAVENOUS ONCE
Status: COMPLETED | OUTPATIENT
Start: 2022-11-21 | End: 2022-11-21

## 2022-11-21 RX ORDER — OXYCODONE AND ACETAMINOPHEN 5; 325 MG/1; MG/1
2 TABLET ORAL
Status: DISCONTINUED | OUTPATIENT
Start: 2022-11-21 | End: 2022-11-23 | Stop reason: HOSPADM

## 2022-11-21 RX ORDER — HYDROCORTISONE ACETATE PRAMOXINE HCL 2.5; 1 G/100G; G/100G
CREAM TOPICAL AS NEEDED
Status: DISCONTINUED | OUTPATIENT
Start: 2022-11-21 | End: 2022-11-23 | Stop reason: HOSPADM

## 2022-11-21 RX ORDER — NALBUPHINE HYDROCHLORIDE 20 MG/ML
5 INJECTION, SOLUTION INTRAMUSCULAR; INTRAVENOUS; SUBCUTANEOUS
Status: DISCONTINUED | OUTPATIENT
Start: 2022-11-21 | End: 2022-11-21

## 2022-11-21 RX ORDER — NALOXONE HYDROCHLORIDE 0.4 MG/ML
0.4 INJECTION, SOLUTION INTRAMUSCULAR; INTRAVENOUS; SUBCUTANEOUS AS NEEDED
Status: DISCONTINUED | OUTPATIENT
Start: 2022-11-21 | End: 2022-11-21

## 2022-11-21 RX ORDER — OXYCODONE AND ACETAMINOPHEN 5; 325 MG/1; MG/1
1 TABLET ORAL
Status: DISCONTINUED | OUTPATIENT
Start: 2022-11-21 | End: 2022-11-23 | Stop reason: HOSPADM

## 2022-11-21 RX ORDER — NORETHINDRONE AND ETHINYL ESTRADIOL 0.5-0.035
10 KIT ORAL ONCE
Status: DISCONTINUED | OUTPATIENT
Start: 2022-11-21 | End: 2022-11-21

## 2022-11-21 RX ORDER — FENTANYL CITRATE 50 UG/ML
INJECTION, SOLUTION INTRAMUSCULAR; INTRAVENOUS
Status: COMPLETED
Start: 2022-11-21 | End: 2022-11-21

## 2022-11-21 RX ORDER — ACETAMINOPHEN 325 MG/1
650 TABLET ORAL
Status: DISCONTINUED | OUTPATIENT
Start: 2022-11-21 | End: 2022-11-21 | Stop reason: SDUPTHER

## 2022-11-21 RX ORDER — SIMETHICONE 80 MG
80 TABLET,CHEWABLE ORAL
Status: DISCONTINUED | OUTPATIENT
Start: 2022-11-21 | End: 2022-11-23 | Stop reason: HOSPADM

## 2022-11-21 RX ORDER — DIPHENHYDRAMINE HCL 25 MG
25 CAPSULE ORAL
Status: DISCONTINUED | OUTPATIENT
Start: 2022-11-21 | End: 2022-11-23 | Stop reason: HOSPADM

## 2022-11-21 RX ORDER — IBUPROFEN 400 MG/1
800 TABLET ORAL EVERY 8 HOURS
Status: DISCONTINUED | OUTPATIENT
Start: 2022-11-21 | End: 2022-11-21 | Stop reason: SDUPTHER

## 2022-11-21 RX ORDER — SODIUM CHLORIDE 0.9 % (FLUSH) 0.9 %
5-40 SYRINGE (ML) INJECTION EVERY 8 HOURS
Status: DISCONTINUED | OUTPATIENT
Start: 2022-11-21 | End: 2022-11-23 | Stop reason: HOSPADM

## 2022-11-21 RX ORDER — FENTANYL/BUPIVACAINE/NS/PF 2-1250MCG
1-16 PREFILLED PUMP RESERVOIR EPIDURAL CONTINUOUS
Status: DISCONTINUED | OUTPATIENT
Start: 2022-11-21 | End: 2022-11-21

## 2022-11-21 RX ORDER — OXYTOCIN/RINGER'S LACTATE 30/500 ML
10 PLASTIC BAG, INJECTION (ML) INTRAVENOUS AS NEEDED
Status: DISCONTINUED | OUTPATIENT
Start: 2022-11-21 | End: 2022-11-23 | Stop reason: HOSPADM

## 2022-11-21 RX ORDER — IBUPROFEN 400 MG/1
800 TABLET ORAL EVERY 8 HOURS
Status: DISCONTINUED | OUTPATIENT
Start: 2022-11-21 | End: 2022-11-23 | Stop reason: HOSPADM

## 2022-11-21 RX ORDER — HYDROCORTISONE 1 %
CREAM (GRAM) TOPICAL AS NEEDED
Status: DISCONTINUED | OUTPATIENT
Start: 2022-11-21 | End: 2022-11-23 | Stop reason: HOSPADM

## 2022-11-21 RX ORDER — SODIUM CHLORIDE, SODIUM LACTATE, POTASSIUM CHLORIDE, CALCIUM CHLORIDE 600; 310; 30; 20 MG/100ML; MG/100ML; MG/100ML; MG/100ML
25 INJECTION, SOLUTION INTRAVENOUS CONTINUOUS
Status: DISCONTINUED | OUTPATIENT
Start: 2022-11-21 | End: 2022-11-21

## 2022-11-21 RX ORDER — DIPHENHYDRAMINE HCL 25 MG
25 CAPSULE ORAL
Status: DISCONTINUED | OUTPATIENT
Start: 2022-11-21 | End: 2022-11-21 | Stop reason: SDUPTHER

## 2022-11-21 RX ORDER — OXYCODONE AND ACETAMINOPHEN 5; 325 MG/1; MG/1
1 TABLET ORAL
Status: DISCONTINUED | OUTPATIENT
Start: 2022-11-21 | End: 2022-11-21 | Stop reason: SDUPTHER

## 2022-11-21 RX ORDER — AMMONIA 15 % (W/V)
1 AMPUL (EA) INHALATION AS NEEDED
Status: DISCONTINUED | OUTPATIENT
Start: 2022-11-21 | End: 2022-11-23 | Stop reason: HOSPADM

## 2022-11-21 RX ORDER — FOLIC ACID/MULTIVIT,IRON,MINER 0.4MG-18MG
1 TABLET ORAL DAILY
Status: DISCONTINUED | OUTPATIENT
Start: 2022-11-21 | End: 2022-11-23 | Stop reason: HOSPADM

## 2022-11-21 RX ORDER — LANOLIN ALCOHOL/MO/W.PET/CERES
3 CREAM (GRAM) TOPICAL
Status: DISCONTINUED | OUTPATIENT
Start: 2022-11-21 | End: 2022-11-23 | Stop reason: HOSPADM

## 2022-11-21 RX ORDER — ACETAMINOPHEN 325 MG/1
650 TABLET ORAL
Status: DISCONTINUED | OUTPATIENT
Start: 2022-11-21 | End: 2022-11-23 | Stop reason: HOSPADM

## 2022-11-21 RX ORDER — ONDANSETRON 4 MG/1
4 TABLET, ORALLY DISINTEGRATING ORAL
Status: ACTIVE | OUTPATIENT
Start: 2022-11-21 | End: 2022-11-22

## 2022-11-21 RX ORDER — BUPIVACAINE HYDROCHLORIDE 2.5 MG/ML
INJECTION, SOLUTION EPIDURAL; INFILTRATION; INTRACAUDAL AS NEEDED
Status: DISCONTINUED | OUTPATIENT
Start: 2022-11-21 | End: 2022-11-21 | Stop reason: HOSPADM

## 2022-11-21 RX ORDER — DOCUSATE SODIUM 100 MG/1
100 CAPSULE, LIQUID FILLED ORAL
Status: DISCONTINUED | OUTPATIENT
Start: 2022-11-21 | End: 2022-11-21 | Stop reason: SDUPTHER

## 2022-11-21 RX ORDER — ONDANSETRON 4 MG/1
4 TABLET, ORALLY DISINTEGRATING ORAL
Status: DISCONTINUED | OUTPATIENT
Start: 2022-11-21 | End: 2022-11-23 | Stop reason: HOSPADM

## 2022-11-21 RX ORDER — NALOXONE HYDROCHLORIDE 0.4 MG/ML
0.4 INJECTION, SOLUTION INTRAMUSCULAR; INTRAVENOUS; SUBCUTANEOUS AS NEEDED
Status: DISCONTINUED | OUTPATIENT
Start: 2022-11-21 | End: 2022-11-23 | Stop reason: HOSPADM

## 2022-11-21 RX ORDER — BUPIVACAINE HYDROCHLORIDE 2.5 MG/ML
INJECTION, SOLUTION EPIDURAL; INFILTRATION; INTRACAUDAL
Status: COMPLETED
Start: 2022-11-21 | End: 2022-11-21

## 2022-11-21 RX ORDER — SODIUM CHLORIDE 0.9 % (FLUSH) 0.9 %
5-40 SYRINGE (ML) INJECTION AS NEEDED
Status: DISCONTINUED | OUTPATIENT
Start: 2022-11-21 | End: 2022-11-23 | Stop reason: HOSPADM

## 2022-11-21 RX ADMIN — SODIUM CHLORIDE, POTASSIUM CHLORIDE, SODIUM LACTATE AND CALCIUM CHLORIDE 125 ML/HR: 600; 310; 30; 20 INJECTION, SOLUTION INTRAVENOUS at 02:42

## 2022-11-21 RX ADMIN — NALBUPHINE HYDROCHLORIDE 10 MG: 20 INJECTION, SOLUTION INTRAMUSCULAR; INTRAVENOUS; SUBCUTANEOUS at 02:41

## 2022-11-21 RX ADMIN — OXYTOCIN 2 MILLI-UNITS/MIN: 10 INJECTION INTRAVENOUS at 04:05

## 2022-11-21 RX ADMIN — IBUPROFEN 800 MG: 400 TABLET, FILM COATED ORAL at 10:08

## 2022-11-21 RX ADMIN — SODIUM CHLORIDE, POTASSIUM CHLORIDE, SODIUM LACTATE AND CALCIUM CHLORIDE 125 ML/HR: 600; 310; 30; 20 INJECTION, SOLUTION INTRAVENOUS at 06:45

## 2022-11-21 RX ADMIN — OXYTOCIN 87.3 MILLI-UNITS/MIN: 10 INJECTION INTRAVENOUS at 09:13

## 2022-11-21 RX ADMIN — LIDOCAINE HYDROCHLORIDE,EPINEPHRINE BITARTRATE 1 ML: 15; .005 INJECTION, SOLUTION EPIDURAL; INFILTRATION; INTRACAUDAL; PERINEURAL at 06:27

## 2022-11-21 RX ADMIN — BUPIVACAINE HYDROCHLORIDE 1 MG: 2.5 INJECTION, SOLUTION EPIDURAL; INFILTRATION; INTRACAUDAL; PERINEURAL at 06:27

## 2022-11-21 RX ADMIN — OXYCODONE AND ACETAMINOPHEN 1 TABLET: 5; 325 TABLET ORAL at 22:38

## 2022-11-21 RX ADMIN — IBUPROFEN 800 MG: 400 TABLET, FILM COATED ORAL at 18:33

## 2022-11-21 RX ADMIN — OXYTOCIN 10000 MILLI-UNITS: 10 INJECTION INTRAVENOUS at 08:58

## 2022-11-21 RX ADMIN — Medication 10 ML/HR: at 06:45

## 2022-11-21 RX ADMIN — FENTANYL CITRATE 100 MCG: 50 INJECTION, SOLUTION INTRAMUSCULAR; INTRAVENOUS at 06:27

## 2022-11-21 NOTE — L&D DELIVERY NOTE
Delivery Summary  Patient: Sage Baires             Circumcision:   desires  Additional Delivery Comments - Uncomplicated PTSVD     Information for the patient's : Geovanni Espino [[id number]]     Delivery Type: Vaginal, Spontaneous   Delivery Date: 2022   Delivery Time: 8:50 AM     Birth Weight:       Sex:  male  Delivery Clinician:  Muna Chatterjee   Gestational Age: 37w1d    Presentation: Vertex   Position:             Apgars were 9  and 9      Resuscitation Method: Tactile Stimulation;Suctioning-bulb     Meconium Stained:  Thin    Living Status: Living       Placenta Date/Time:     Placenta Removal:     Placenta Appearance:      Cord Information: 3 Vessels    Cord Events:         Disposition of Cord Blood: Discard    Blood Gases Sent?:  No     Cord pH:  none    Episiotomy:    Laceration(s):      Estimated Blood Loss (ml): No data found    Labor Events  Method: Oxytocin      Augmentation:    Cervical Ripening:     Cervidil;Misoprostol        Operative Vaginal Delivery - none

## 2022-11-21 NOTE — LACTATION NOTE
This note was copied from a baby's chart. Initial Lactation Consultation - Baby born vaginally this morning to a  mom at 39 2/7 weeks gestation. Mom noticed breast changes during her pregnancy and has bene able to express colostrum. Mom states baby latched and nursed a few minutes at delivery. I helped mom with a feeding this afternoon. We reviewed positioning the baby at the breast. I helped mom get baby latched deeply. Baby began sucking rhythmically with audible swallows. We were able to get baby latched on both breasts. Feeding Plan: Mother will keep baby skin to skin as often as possible, feed on demand, respond to feeding cues, obtain latch, listen for audible swallowing, be aware of signs of oxytocin release/ cramping, thirst and sleepiness while breastfeeding. Reviewed effects/risks of late  delivery on initiation of breast feeding including infant's sleepiness, ineffective or missed breast feedings, infant's decreased stamina to sustain prolonged latch and effective breast feeding, decreased energy reserves related to low birth weight and inability to stimulate milk supply. Recommended interventions include skin to skin, feeding on cues and pumping as needed to ensure adequate milk supply.

## 2022-11-21 NOTE — PROGRESS NOTES
Bedside shift change report given to ROXANA Schneider RN (oncoming nurse) by IFEANYI Chan RN (offgoing nurse). Report included the following information SBAR, Kardex, and MAR.     [id number] IV bolus started and pt placed in trendelenburg for recurrent variables. [id number] Dr Fabiola Byrd requested at bedside to evaluate pt for variables. Cervix is checked and pt is complete. We will let pt labor down for a short bit until physician available to be at bedside.

## 2022-11-21 NOTE — PROGRESS NOTES
Bedside and Verbal shift change report given to TIMUR Vera RN (oncoming nurse) by Flor Loo. Uzair Driver RN (offgoing nurse). Report included the following information SBAR, Kardex, Procedure Summary, and MAR.

## 2022-11-21 NOTE — ANESTHESIA PROCEDURE NOTES
CSE Block    Start time: 11/21/2022 6:17 AM  End time: 11/21/2022 6:31 AM  Performed by: Sebas Heath DO  Authorized by: Sebas Heath DO     Pre-Procedure  Indications: procedure for pain    preanesthetic checklist: patient identified, risks and benefits discussed, anesthesia consent, site marked, patient being monitored, timeout performed and fire risk safety assessment completed and verbalized    Timeout Time: 06:17 EST      Procedure:   Patient Position:  Seated  Prep Region:  Lumbar  Prep: chlorhexidine    Location:  L2-3    Epidural Needle:   Needle Type:  Tuohy  Needle Gauge:  17 G  Injection Technique:  Loss of resistance using air  Attempts:  1    Spinal Needle:   Needle Type:  Pencan  Needle Gauge:  25 G    Catheter:   Catheter Type:  Flex-tip  Catheter Size:  18 G  Catheter at Skin Depth (cm):  12  Depth in Epidural Space (cm):  5  Events: no blood with aspiration and CSF confirmed    Med Admin time: 11/21/2022 6:27 AM    Assessment:   Catheter Secured:  Tegaderm  Insertion:  Uncomplicated  Patient tolerance:  Patient tolerated the procedure well with no immediate complications  Meds:    2 ml of 1% Lidocaine plain for skin & subQ    - 1.0 ml of 0.255 marcaine isobaric for CSE dose  - 1.0 ml of 1.5% Lidocaine w/ epi 1:200,000 for test dose  - 100 mcg fentanyl in epidural

## 2022-11-21 NOTE — PROGRESS NOTES
Labor Progress Note  Patient seen, fetal heart rate and contraction pattern evaluated. Comfortable with Epidural    VSS AF    Physical Exam:  Cervical Exam:6-7 mildly swollen anteriorly/70/-2  Membranes:  PPROM  Uterine Activity: Frequency: Irregular  Fetal Heart Rate: Reactive  Accelerations: yes  Decelerations: variable  Uterine contractions: irregular    Assessment/Plan:  Reassuring fetal status.    Continue to titrate Marcelenlolly Smith MD

## 2022-11-21 NOTE — ANESTHESIA PREPROCEDURE EVALUATION
Anesthetic History   No history of anesthetic complications            Review of Systems / Medical History  Patient summary reviewed, nursing notes reviewed and pertinent labs reviewed    Pulmonary  Within defined limits                 Neuro/Psych         Psychiatric history     Cardiovascular                  Exercise tolerance: >4 METS     GI/Hepatic/Renal  Within defined limits              Endo/Other  Within defined limits           Other Findings              Physical Exam    Airway  Mallampati: II  TM Distance: 4 - 6 cm  Neck ROM: normal range of motion        Cardiovascular  Regular rate and rhythm,  S1 and S2 normal,  no murmur, click, rub, or gallop             Dental  No notable dental hx       Pulmonary                 Abdominal         Other Findings            Anesthetic Plan    ASA: 2  Anesthesia type: CSE            Anesthetic plan and risks discussed with: Patient

## 2022-11-21 NOTE — L&D DELIVERY NOTE
Delivery Summary  Patient: Salma Durán             Circumcision:   desirekaelyn Tucker)  Additional Delivery Comments - Called to bedside for delivery as Dr. Victoriano Mobley was scrubbed in the OR and the baby's head was visible at the introitus when the RN went to I&O her. Pt was comfortable with urge to push when she had a contraction. Due to very thick meconium present, NICU was called for delivery as well. Pt pushed with great effort through about 2 contractions and delivered a VMI Princess Tucker) in ISIDRO position. He had his left hand and cord in front of his neck/chest at delivery. No difficulty with delivery of shoulders/body. Baby was placed directly on mom's abdomen. NICU ok'd delayed cord clamping, approximately 1 minute. It was then doubly clamped by me and then cut by mom's support person Janae Wells; dad was not back in time for delivery). As the baby was doing well, NICU ok'd him to stay on mom's chest and they left. There was a 3VC attached to an intact meconium stained placenta that delivered within 5 minutes wo difficulty. A small, non hemostatic, 1st degree was repaired with a figure of 8 stitch of 2.0 monocryl. I&O cath was performed sterilely by me for 600 cc of urine. Mom and baby Danny Anaya were left in room doing skin to skin. QBL 88 mL  Weight 2270 g (5#0.1oz)  Apgars 9 & 9        Information for the patient's : Nelida Londono [facility] [[id number]]     Labor Events:    Labor: Yes    Steroids: Full Course   Cervical Ripening Date/Time:       Cervical Ripening Type: Cervidil;Misoprostol   Antibiotics During Labor: Yes   Rupture Identifier:      Rupture Date/Time:       Rupture Type: SROM   Amniotic Fluid Volume:      Amniotic Fluid Description:      Amniotic Fluid Odor:      Induction: Oxytocin       Induction Date/Time: 2022 4:05 AM    Indications for Induction: Prolonged ROM; Premature ROM    Augmentation:     Augmentation Date/Time:      Indications for Augmentation:     Labor complications: Additional complications:        Delivery Events:  Indications For Episiotomy:     Episiotomy: None   Perineal Laceration(s): 1st   Repaired:     Periurethral Laceration Location:      Repaired:     Labial Laceration Location:     Repaired:     Sulcal Laceration Location:     Repaired:     Vaginal Laceration Location:     Repaired:     Cervical Laceration Location:     Repaired:     Repair Suture: Monocryl 2-0   Number of Repair Packets: 1   Estimated Blood Loss (ml):  ml   Quantitative Blood Loss (ml)                Delivery Date: 2022    Delivery Time: 8:50 AM  Delivery Type: Vaginal, Spontaneous  Sex:  Male    Gestational Age: 37w1d   Delivery Clinician:  Marla Jose  Living Status: Living   Delivery Location: L&D 4          APGARS  One minute Five minutes Ten minutes   Skin color: 1   1        Heart rate: 2   2        Grimace: 2   2        Muscle tone: 2   2        Breathin   2        Totals: 9   9            Presentation: Vertex    Position:        Resuscitation Method:  Tactile Stimulation;Suctioning-bulb     Meconium Stained: Thin      Cord Information: 3 Vessels  Complications:    Cord around:    Delayed cord clamping?     Cord clamped date/time:2022  8:52 AM  Disposition of Cord Blood: Discard    Blood Gases Sent?: No    Placenta:  Date/Time: 2022  8:52 AM  Removal: Spontaneous      Appearance: Normal      Measurements:  Birth Weight: 2.27 kg      Birth Length: 47 cm      Head Circumference: 31.5 cm      Chest Circumference:       Abdominal Girth: 28.5 cm    Other Providers:   ;Kendra MONTEMAYOR;JADE LU;DERIK QUINTANILLA;ARAVIND TREVIZO;;;;;;ANNETTE BENSON, Obstetrician;Primary Nurse;Primary  Nurse;Nicu Nurse;Neonatologist;Anesthesiologist;Crna;Nurse Practitioner;Midwife;Nursery Nurse;Respiratory Therapist         Cord pH:  none    Episiotomy: None   Laceration(s): 1st     Estimated Blood Loss (ml): No data found    Labor Events  Method: Oxytocin      Augmentation:    Cervical Ripening:     Cervidil;Misoprostol        Hospital Problems  Never Reviewed            Codes Class Noted POA    PROM (premature rupture of membranes) ICD-10-CM: O42.90  ICD-9-CM: 658.10  2022 Unknown           Operative Vaginal Delivery - none    Group B Strep: No results found for: GRBSEXT, GRBSEXT  Information for the patient's :    Abraham Narayan [[id number]]   No results found for: ABORH, PCTABR, PCTDIG, BILI, ABORHEXT, ABORH   No results found for: APH, APCO2, APO2, AHCO3, ABEC, ABDC, O2ST, EPHV, PCO2V, PO2V, HCO3V, EBEV, EBDV, SITE, RSCOM

## 2022-11-21 NOTE — PROGRESS NOTES
~5721: Bedside and Verbal shift change report given to IFEANYI Hernandez RN by IFEANYI Irby RN. Report included the following information SBAR.   ~2019: External monitors are removed in preparation for transfer to &D 4.  ~2157: Dr Archie Grant is called and informed that the patient now has meconium stained fluid. Verbal order is received to start Pitocin at 0300 and do not give any more Cytotec.   ~0742: Bedside and Verbal shift change report given to L. Valentino Sings by IFEANYI Hernandez RN. Report included the following information SBAR, MAR, Accordion, and Recent Results.

## 2022-11-21 NOTE — ROUTINE PROCESS
1125- TRANSFER - IN REPORT:    Verbal report received from Alek Boston RN(name) on [facility]  being received from L&D(unit) for routine progression of care      Report consisted of patients Situation, Background, Assessment and   Recommendations(SBAR). Information from the following report(s) SBAR was reviewed with the receiving nurse. Opportunity for questions and clarification was provided. Assessment completed upon patients arrival to unit and care assumed.

## 2022-11-22 LAB
C TRACH RRNA SPEC QL NAA+PROBE: NEGATIVE
N GONORRHOEA RRNA SPEC QL NAA+PROBE: NEGATIVE
SPECIMEN SOURCE: NORMAL

## 2022-11-22 PROCEDURE — 74011250637 HC RX REV CODE- 250/637: Performed by: OBSTETRICS & GYNECOLOGY

## 2022-11-22 PROCEDURE — 65410000002 HC RM PRIVATE OB

## 2022-11-22 RX ORDER — IBUPROFEN 800 MG/1
800 TABLET ORAL EVERY 8 HOURS
Qty: 30 TABLET | Refills: 1 | Status: SHIPPED | OUTPATIENT
Start: 2022-11-22

## 2022-11-22 RX ADMIN — IBUPROFEN 800 MG: 400 TABLET, FILM COATED ORAL at 21:09

## 2022-11-22 RX ADMIN — OXYCODONE AND ACETAMINOPHEN 1 TABLET: 5; 325 TABLET ORAL at 13:01

## 2022-11-22 RX ADMIN — OXYCODONE AND ACETAMINOPHEN 1 TABLET: 5; 325 TABLET ORAL at 17:00

## 2022-11-22 RX ADMIN — IBUPROFEN 800 MG: 400 TABLET, FILM COATED ORAL at 10:32

## 2022-11-22 RX ADMIN — IBUPROFEN 800 MG: 400 TABLET, FILM COATED ORAL at 02:26

## 2022-11-22 RX ADMIN — Medication 1 TABLET: at 10:32

## 2022-11-22 RX ADMIN — OXYCODONE AND ACETAMINOPHEN 1 TABLET: 5; 325 TABLET ORAL at 21:08

## 2022-11-22 RX ADMIN — OXYCODONE AND ACETAMINOPHEN 1 TABLET: 5; 325 TABLET ORAL at 02:26

## 2022-11-22 NOTE — ROUTINE PROCESS
Bedside shift change report given to A Supriya Sr RN (oncoming nurse) by Micah Perez RN (offgoing nurse). Report included the following information SBAR. Murtaza Terrazas RN.     5788 - I have reviewed and agree with all charting and medication administration completed by Alfie Aburto RN.

## 2022-11-22 NOTE — ROUTINE PROCESS
Bedside and Verbal shift change report given to Lorena Perez RN (oncoming nurse) by Cristopher Morrison RN  (offgoing nurse). Report included the following information SBAR.

## 2022-11-22 NOTE — PROGRESS NOTES
Post-Partum Day Number 1 Progress Note    [place]     Assessment: Doing well, post partum day 1  Scant PNC  THC+ on admit  Smoker - nicotine patch    Plan:  - Continue routine postpartum and perineal care as well as maternal education. - desires circumcision but given LBW will plan for outpatient  - Plan discharge home [facility] Discharge Date: Tomorrow. Information for the patient's : Teodora Honeycutt [[id number]]   Vaginal, Spontaneous  Patient doing well without significant complaint. Voiding without difficulty, normal lochia. Vitals:  Visit Vitals  /74 (BP 1 Location: Right upper arm, BP Patient Position: At rest)   Pulse 64   Temp 97.6 °F (36.4 °C)   Resp 16   Ht 5' 6\" (1.676 m)   Wt 81.6 kg (180 lb)   SpO2 100%   Breastfeeding Unknown   BMI 29.05 kg/m²     Temp (24hrs), Av.9 °F (36.6 °C), Min:97.6 °F (36.4 °C), Max:98.2 °F (36.8 °C)        Exam:   Patient without distress. Fundus firm, nontender per nursing fundal checks. Perineum with normal lochia noted per nursing assessment. Lower extremities are negative for pathological edema. Labs:     Lab Results   Component Value Date/Time    WBC 15.5 (H) 2022 08:56 PM    WBC 13.2 (H) 2022 08:24 PM    HGB 11.0 (L) 2022 08:56 PM    HGB 12.7 2022 08:24 PM    HCT 33.1 (L) 2022 08:56 PM    HCT 37.5 2022 08:24 PM    PLATELET 499  08:56 PM    PLATELET 628  08:24 PM       No results found for this or any previous visit (from the past 24 hour(s)).

## 2022-11-22 NOTE — DISCHARGE SUMMARY
Obstetrical Discharge Summary     Name: Valerie Chris MRN: [id number]  SSN: [id number]    YOB: 2000  Age: 20 y.o. Sex: female      Admit Date: 2022    Discharge Date: 22    Admitting Physician: Philippe Castorena MD     Attending Physician:  Delonte Acevedo MD     Admission Diagnoses: PROM (premature rupture of membranes) [O42.90]    Discharge Diagnoses:   Information for the patient's : Lorena Gutierrez [[id number]]   Delivery of a 2.27 kg male infant via Vaginal, Spontaneous on 2022 at 8:50 AM  by Anai Herbert. Apgars were 9  and 9 . Additional Diagnoses:   Hospital Problems  Never Reviewed            Codes Class Noted POA    PROM (premature rupture of membranes) ICD-10-CM: O42.90  ICD-9-CM: 658.10  2022 Unknown          Lab Results   Component Value Date/Time    Rubella, External non-immune 2022 12:00 AM       Hospital Course: Normal hospital course following the delivery. Patient Instructions:   Current Discharge Medication List        START taking these medications    Details   ibuprofen (MOTRIN) 800 mg tablet Take 1 Tablet by mouth every eight (8) hours. Qty: 30 Tablet, Refills: 1           CONTINUE these medications which have NOT CHANGED    Details   prenatal 147/iron/folic acid (PRENATAL 529-IHHN GLUC-FOLIC PO) Take 1 Tablet by mouth daily. Disposition at Discharge: Home or self care    Condition at Discharge: Stable    Reference my discharge instructions.     Follow-up Appointments   Procedures    FOLLOW UP VISIT Appointment in: 6 Weeks At [facility]     At [facility]     Standing Status:   Standing     Number of Occurrences:   1     Order Specific Question:   Appointment in     Answer:   6 Weeks        Signed By:  Viv Fitzgerald MD     2022

## 2022-11-22 NOTE — DISCHARGE INSTRUCTIONS
POSTPARTUM DISCHARGE INSTRUCTIONS       Name:  Jung Tomlinson  YOB: 2000  Admission Diagnosis:  PROM (premature rupture of membranes) [O42.90]     Discharge Diagnosis:  [unfilled]  Attending Physician:  [unfilled]    Delivery Type:  Vaginal Childbirth with Episiotomy, Laceration or Tear: What To Expect At [address] [place] [address] body will slowly heal in the next few weeks. It is easy to get too tired and overwhelmed during the first weeks after your baby is born. Changes in your hormones can shift your mood without warning. You may find it hard to meet the extra demands on your energy and time. Take it easy on yourself. Follow-up care is a key part of your treatment and safety. Be sure to make and go to all appointments, and call your doctor if you are having problems. It's also a good idea to know your test results and keep a list of the medicines you take. How can you care for yourself at home? Vaginal Bleeding and Cramps  After delivery, you will have a bloody discharge from the vagina. This will turn pink within a week and then white or yellow after about 10 days. It may last for 2 to 4 weeks or longer, until the uterus has healed. Use pads instead of tampons until you stop bleeding. Do not worry if you pass some blood clots, as long as they are smaller than a golf ball. If you have a tear or stitches in your vaginal area, change the pad at least every 4 hours to prevent soreness and infection. You may have cramps for the first few days after childbirth. These are normal and occur as the uterus shrinks to normal size. Take an over-the-counter pain medicine, such as acetaminophen (Tylenol), ibuprofen (Advil, Motrin), or naproxen (Aleve), for cramps. Read and follow all instructions on the label. Do not take aspirin, because it can cause more bleeding. Do not take acetaminophen (Tylenol) and other acetaminophen containing medications (i.e. Percocet) at the same time.      Episiotomy, Lacerations or Tears  If you have stitches, they will dissolve on their own and do not need to be removed. Put ice or a cold pack on your painful area for 10 to 20 minutes at a time, several times a day, for the first few days. Put a thin cloth between the ice and your skin. Sit in a few inches of warm water (sitz bath) 3 times a day and after bowel movements. The warm water helps with pain and itching. If you do not have a tub, a warm shower might help. Breast fullness  Your breasts may overfill (engorge) in the first few days after delivery. To help milk flow and to relieve pain, warm your breasts in the shower or by using warm, moist towels before nursing. If you are not nursing, do not put warmth on your breasts or touch your breasts. Wear a tight bra or sports bra and use ice until the fullness goes away. This usually takes 2 to 3 days. Put ice or a cold pack on your breast after nursing to reduce swelling and pain. Put a thin cloth between the ice and your skin. Activity  Eat a balanced diet. Do not try to lose weight by cutting calories. Keep taking your prenatal vitamins, or take a multivitamin. Get as much rest as you can. Try to take naps when your baby sleeps during the day. Get some exercise every day. But do not do any heavy exercise until your doctor says it is okay. Wait until you are healed (about 4 to 6 weeks) before you have sexual intercourse. Your doctor will tell you when it is okay to have sex. Talk to your doctor about birth control. You can get pregnant even before your period returns. Also, you can get pregnant while you are breast-feeding. Mental Health  Many women get the \"baby blues\" during the first few days after childbirth. You may lose sleep, feel irritable, and cry easily. You may feel happy one minute and sad the next. Hormone changes are one cause of these emotional changes.  Also, the demands of a new baby, along with visits from relatives or other family needs, add to a mother's stress. The \"baby blues\" often peak around the fourth day. Then they ease up in less than 2 weeks. If your moodiness or anxiety lasts for more than 2 weeks, or if you feel like life is not worth living, you may have postpartum depression. This is different for each mother. Some mothers with serious depression may worry intensely about their infant's well-being. Others may feel distant from their child. Some mothers might even feel that they might harm their baby. A mother may have signs of paranoia, wondering if someone is watching her. With all the changes in your life, you may not know if you are depressed. Pregnancy sometimes causes changes in how you feel that are similar to the symptoms of depression. Symptoms of depression include:  Feeling sad or hopeless and losing interest in daily activities. These are the most common symptoms of depression. Sleeping too much or not enough. Feeling tired. You may feel as if you have no energy. Eating too much or too little. [facility] [employer] ([employer]) offers a Warm line; Chat with the Expert phone sessions; Information and Articles about Pregnancy and Postpartum Mood Disorders; Comprehensive List of Free Support Groups; Knowledgeable local coordinators who will offer support, information, and resources; Guide to Resources on Eventbrite; Calendar of events in the  mood disorders community; Latest News and Research; and [facility] [address] for [employer]. Remember - You are not alone; You are not to blame; With help, you will be well. 8-955-519-PPD(4925). WWW. POSTPARTUM. NET    Writing or talking about death, such as writing suicide notes or talking about guns, knives, or pills. Keep the numbers for these national suicide hotlines: 4-729-564-TALK (6-888.900.3067) and 1-397-WXXLBNB (7-880.287.5433). If you or someone you know talks about suicide or feeling hopeless, get help right away.     Constipation and Hemorrhoids  Drink plenty of fluids, enough so that your urine is light yellow or clear like water. If you have kidney, heart, or liver disease and have to limit fluids, talk with your doctor before you increase the amount of fluids you drink. Eat plenty of fiber each day. Have a bran muffin or bran cereal for breakfast, and try eating a piece of fruit for a mid-afternoon snack. For painful, itchy hemorrhoids, put ice or a cold pack on the area several times a day for 10 minutes at a time. Follow this by putting a warm compress on the area for another 10 to 20 minutes or by sitting in a shallow, warm bath. When should you call for help? Call 911 anytime you think you may need emergency care. For example, call if:  You are thinking of hurting yourself, your baby, or anyone else. You passed out (lost consciousness). You have symptoms of a blood clot in your lung (called a pulmonary embolism). These may include:  Sudden chest pain. Trouble breathing. Coughing up blood. Call your doctor now or seek immediate medical care if:  You have severe vaginal bleeding. You are soaking through a pad each hour for 2 or more hours. Your vaginal bleeding seems to be getting heavier or is still bright red 4 days after delivery. You are dizzy or lightheaded, or you feel like you may faint. You are vomiting or cannot keep fluids down. You have a fever. You have new or more belly pain. You pass tissue (not just blood). Your vaginal discharge smells bad. Your belly feels tender or full and hard. Your breasts are continuously painful or red. You feel sad, anxious, or hopeless for more than a few days. You have sudden, severe pain in your belly. You have symptoms of a blood clot in your leg (called a deep vein thrombosis), such as:  Pain in your calf, back of the knee, thigh, or groin. Redness and swelling in your leg or groin. You have symptoms of preeclampsia, such as:  Sudden swelling of your face, hands, or feet.   New vision problems (such as dimness or blurring). A severe headache. Your blood pressure is higher than it should be or rises suddenly. You have new nausea or vomiting. Watch closely for changes in your health, and be sure to contact your doctor if you have any problems. Additional Information:  Postpartum Support    PARENTS:  Are you feeling sad or depressed? Is it difficult for you to enjoy yourself? Do you feel more irritable or tense? Do you feel anxious or panicky? Are you having difficulty bonding with your baby? Do you feel as if you are \"out of control\" or \"going crazy\"? Are you worried that you might hurt your baby or yourself? FAMILIES: Do you worry that something is wrong but don't know how to help? Do you think that your partner or spouse is having problems coping? Are you worried that it may never get better? While many women experience some mild mood change or \"the blues\" during or after the birth of a child, 1 in 9 women experience more significant symptoms of depression or anxiety. 1 in 10 Dads become depressed during the first year. Things you can do  Being a good parent includes taking care of yourself. If you take care of yourself, you will be able to take better care of your baby and your family. Talk to a counselor or healthcare provider who has training in  mood and anxiety problems. Learn as much as you can about pregnancy and postpartum depression and anxiety. Get support from family and friends. Ask for help when you need it. Join a support group in your area or online. Keep active by walking, stretching or whatever form of exercise helps you to feel better. Get enough rest and time for yourself. Eat a healthy diet. Don't give up! It may take more than one try to get the right help you need.      These are general instructions for a healthy lifestyle:    No smoking/ No tobacco products/ Avoid exposure to second hand smoke    Surgeon General's Warning:  Quitting smoking now greatly reduces serious risk to your health. Obesity, smoking, and sedentary lifestyle greatly increases your risk for illness    A healthy diet, regular physical exercise & weight monitoring are important for maintaining a healthy lifestyle    Recognize signs and symptoms of STROKE:    F-face looks uneven    A-arms unable to move or move unevenly    S-speech slurred or non-existent    T-time-call 911 as soon as signs and symptoms begin - DO NOT go       back to bed or wait to see if you get better - TIME IS BRAIN. I have had the opportunity to make my options or choices for discharge. I have received and understand these instructions. Postpartum Support Groups   We know that all of us are dealing with a tremendous amount of uncertainty, confusion and disruption to our daily lives, which may result in increased anxiety, depression and fear. If you are feeling unsettled or worse, please know that we are here to help. During this time of increased caution and care for one another, [facility] [place] ([employer]) is offering virtual and in person support groups to ALL MOTHERS in [place] regardless of the age of your child/children as a way to help weather this emotional storm together. Social support is an important part of self-care during this time of physical distancing. Virtual postpartum support group meetings available at www. postpartumva.org  Warm Line: 783.235.5232    Breastfeeding Support Groups   1st and 3rd Wednesday of each month at [facility]  2nd and 4th Tuesday of each month at [facility] [employer] (in education center behind cafeteria)    [place] at [email or web address] under the \"About Us\" and \"Classes and Events tabs\"

## 2022-11-23 VITALS
BODY MASS INDEX: 28.93 KG/M2 | HEIGHT: 66 IN | SYSTOLIC BLOOD PRESSURE: 132 MMHG | WEIGHT: 180 LBS | HEART RATE: 81 BPM | OXYGEN SATURATION: 97 % | RESPIRATION RATE: 18 BRPM | DIASTOLIC BLOOD PRESSURE: 78 MMHG | TEMPERATURE: 98.1 F

## 2022-11-23 PROCEDURE — 74011250637 HC RX REV CODE- 250/637: Performed by: OBSTETRICS & GYNECOLOGY

## 2022-11-23 PROCEDURE — 74011250636 HC RX REV CODE- 250/636: Performed by: OBSTETRICS & GYNECOLOGY

## 2022-11-23 PROCEDURE — 90707 MMR VACCINE SC: CPT | Performed by: OBSTETRICS & GYNECOLOGY

## 2022-11-23 RX ADMIN — ACETAMINOPHEN 650 MG: 325 TABLET ORAL at 09:32

## 2022-11-23 RX ADMIN — OXYCODONE AND ACETAMINOPHEN 1 TABLET: 5; 325 TABLET ORAL at 04:05

## 2022-11-23 RX ADMIN — IBUPROFEN 800 MG: 400 TABLET, FILM COATED ORAL at 05:45

## 2022-11-23 RX ADMIN — IBUPROFEN 800 MG: 400 TABLET, FILM COATED ORAL at 15:08

## 2022-11-23 RX ADMIN — Medication 1 TABLET: at 09:32

## 2022-11-23 RX ADMIN — MEASLES, MUMPS, AND RUBELLA VIRUS VACCINE LIVE 0.5 ML: 1000; 12500; 1000 INJECTION, POWDER, LYOPHILIZED, FOR SUSPENSION SUBCUTANEOUS at 15:00

## 2022-11-23 NOTE — LACTATION NOTE
This note was copied from a baby's chart. Mom and baby scheduled for discharge today. I did not see the baby at the breast. Mom states Baby nursing well and has improved throughout post partum stay, deep latch maintained, mother is comfortable, milk is in transition, baby feeding vigorously with rhythmic suck, swallow, breathe pattern, with audible swallowing, and evident milk transfer, both breasts offered, baby is asleep following feeding. Baby is feeding on demand. We reviewed cluster feeding, engorgement and pumping. Breast feeding teaching completed and all questions answered.

## 2022-11-23 NOTE — ROUTINE PROCESS
Bedside and Verbal shift change report given to Rob Elaine RN (oncoming nurse) by ESSIE Ascencio (offgoing nurse). Report included the following information SBAR.